# Patient Record
Sex: FEMALE | Race: WHITE | NOT HISPANIC OR LATINO
[De-identification: names, ages, dates, MRNs, and addresses within clinical notes are randomized per-mention and may not be internally consistent; named-entity substitution may affect disease eponyms.]

---

## 2020-02-18 ENCOUNTER — RESULT REVIEW (OUTPATIENT)
Age: 25
End: 2020-02-18

## 2020-02-27 PROBLEM — Z00.00 ENCOUNTER FOR PREVENTIVE HEALTH EXAMINATION: Status: ACTIVE | Noted: 2020-02-27

## 2020-03-16 ENCOUNTER — LABORATORY RESULT (OUTPATIENT)
Age: 25
End: 2020-03-16

## 2020-03-16 ENCOUNTER — ASOB RESULT (OUTPATIENT)
Age: 25
End: 2020-03-16

## 2020-03-16 ENCOUNTER — APPOINTMENT (OUTPATIENT)
Dept: ANTEPARTUM | Facility: CLINIC | Age: 25
End: 2020-03-16
Payer: COMMERCIAL

## 2020-03-16 PROCEDURE — 36416 COLLJ CAPILLARY BLOOD SPEC: CPT

## 2020-03-16 PROCEDURE — 76813 OB US NUCHAL MEAS 1 GEST: CPT

## 2020-03-23 ENCOUNTER — TRANSCRIPTION ENCOUNTER (OUTPATIENT)
Age: 25
End: 2020-03-23

## 2020-05-12 ENCOUNTER — APPOINTMENT (OUTPATIENT)
Dept: ANTEPARTUM | Facility: CLINIC | Age: 25
End: 2020-05-12
Payer: COMMERCIAL

## 2020-05-12 ENCOUNTER — ASOB RESULT (OUTPATIENT)
Age: 25
End: 2020-05-12

## 2020-05-12 PROCEDURE — 76811 OB US DETAILED SNGL FETUS: CPT

## 2020-05-12 PROCEDURE — 99201 OFFICE OUTPATIENT NEW 10 MINUTES: CPT | Mod: 25

## 2020-05-19 ENCOUNTER — APPOINTMENT (OUTPATIENT)
Dept: ANTEPARTUM | Facility: CLINIC | Age: 25
End: 2020-05-19

## 2020-07-07 ENCOUNTER — APPOINTMENT (OUTPATIENT)
Dept: ANTEPARTUM | Facility: CLINIC | Age: 25
End: 2020-07-07
Payer: COMMERCIAL

## 2020-07-07 ENCOUNTER — ASOB RESULT (OUTPATIENT)
Age: 25
End: 2020-07-07

## 2020-07-07 PROCEDURE — 76816 OB US FOLLOW-UP PER FETUS: CPT

## 2020-09-29 ENCOUNTER — INPATIENT (INPATIENT)
Facility: HOSPITAL | Age: 25
LOS: 2 days | Discharge: HOME | End: 2020-10-02
Attending: OBSTETRICS & GYNECOLOGY | Admitting: OBSTETRICS & GYNECOLOGY
Payer: COMMERCIAL

## 2020-09-29 DIAGNOSIS — Z3A.00 WEEKS OF GESTATION OF PREGNANCY NOT SPECIFIED: ICD-10-CM

## 2020-09-29 DIAGNOSIS — O26.899 OTHER SPECIFIED PREGNANCY RELATED CONDITIONS, UNSPECIFIED TRIMESTER: ICD-10-CM

## 2020-09-30 VITALS — HEIGHT: 62 IN | WEIGHT: 165.35 LBS

## 2020-09-30 DIAGNOSIS — Z34.80 ENCOUNTER FOR SUPERVISION OF OTHER NORMAL PREGNANCY, UNSPECIFIED TRIMESTER: ICD-10-CM

## 2020-09-30 LAB
BASOPHILS # BLD AUTO: 0.01 K/UL — SIGNIFICANT CHANGE UP (ref 0–0.2)
BASOPHILS NFR BLD AUTO: 0.1 % — SIGNIFICANT CHANGE UP (ref 0–2)
BLD GP AB SCN SERPL QL: NEGATIVE — SIGNIFICANT CHANGE UP
EOSINOPHIL # BLD AUTO: 0.07 K/UL — SIGNIFICANT CHANGE UP (ref 0–0.5)
EOSINOPHIL NFR BLD AUTO: 0.6 % — SIGNIFICANT CHANGE UP (ref 0–6)
HCT VFR BLD CALC: 39.2 % — SIGNIFICANT CHANGE UP (ref 34.5–45)
HGB BLD-MCNC: 13.2 G/DL — SIGNIFICANT CHANGE UP (ref 11.5–15.5)
IMM GRANULOCYTES NFR BLD AUTO: 0.6 % — SIGNIFICANT CHANGE UP (ref 0–1.5)
LYMPHOCYTES # BLD AUTO: 19.4 % — SIGNIFICANT CHANGE UP (ref 13–44)
LYMPHOCYTES # BLD AUTO: 2.44 K/UL — SIGNIFICANT CHANGE UP (ref 1–3.3)
MCHC RBC-ENTMCNC: 29.6 PG — SIGNIFICANT CHANGE UP (ref 27–34)
MCHC RBC-ENTMCNC: 33.7 GM/DL — SIGNIFICANT CHANGE UP (ref 32–36)
MCV RBC AUTO: 87.9 FL — SIGNIFICANT CHANGE UP (ref 80–100)
MONOCYTES # BLD AUTO: 0.66 K/UL — SIGNIFICANT CHANGE UP (ref 0–0.9)
MONOCYTES NFR BLD AUTO: 5.3 % — SIGNIFICANT CHANGE UP (ref 2–14)
NEUTROPHILS # BLD AUTO: 9.3 K/UL — HIGH (ref 1.8–7.4)
NEUTROPHILS NFR BLD AUTO: 74 % — SIGNIFICANT CHANGE UP (ref 43–77)
NRBC # BLD: 0 /100 WBCS — SIGNIFICANT CHANGE UP (ref 0–0)
PLATELET # BLD AUTO: 335 K/UL — SIGNIFICANT CHANGE UP (ref 150–400)
RBC # BLD: 4.46 M/UL — SIGNIFICANT CHANGE UP (ref 3.8–5.2)
RBC # FLD: 13.7 % — SIGNIFICANT CHANGE UP (ref 10.3–14.5)
RH IG SCN BLD-IMP: POSITIVE — SIGNIFICANT CHANGE UP
RH IG SCN BLD-IMP: POSITIVE — SIGNIFICANT CHANGE UP
SARS-COV-2 RNA SPEC QL NAA+PROBE: SIGNIFICANT CHANGE UP
T PALLIDUM AB TITR SER: NEGATIVE — SIGNIFICANT CHANGE UP
WBC # BLD: 12.56 K/UL — HIGH (ref 3.8–10.5)
WBC # FLD AUTO: 12.56 K/UL — HIGH (ref 3.8–10.5)

## 2020-09-30 RX ORDER — SODIUM CHLORIDE 9 MG/ML
1000 INJECTION, SOLUTION INTRAVENOUS
Refills: 0 | Status: DISCONTINUED | OUTPATIENT
Start: 2020-09-30 | End: 2020-10-01

## 2020-09-30 RX ORDER — OXYTOCIN 10 UNIT/ML
333.33 VIAL (ML) INJECTION
Qty: 20 | Refills: 0 | Status: DISCONTINUED | OUTPATIENT
Start: 2020-09-30 | End: 2020-10-02

## 2020-09-30 RX ORDER — OXYTOCIN 10 UNIT/ML
4 VIAL (ML) INJECTION
Qty: 30 | Refills: 0 | Status: DISCONTINUED | OUTPATIENT
Start: 2020-09-30 | End: 2020-10-02

## 2020-09-30 RX ORDER — CITRIC ACID/SODIUM CITRATE 300-500 MG
15 SOLUTION, ORAL ORAL EVERY 6 HOURS
Refills: 0 | Status: DISCONTINUED | OUTPATIENT
Start: 2020-09-30 | End: 2020-10-01

## 2020-09-30 RX ADMIN — SODIUM CHLORIDE 125 MILLILITER(S): 9 INJECTION, SOLUTION INTRAVENOUS at 09:13

## 2020-09-30 RX ADMIN — Medication 4 MILLIUNIT(S)/MIN: at 17:18

## 2020-10-01 ENCOUNTER — APPOINTMENT (OUTPATIENT)
Dept: ANTEPARTUM | Facility: CLINIC | Age: 25
End: 2020-10-01

## 2020-10-01 ENCOUNTER — TRANSCRIPTION ENCOUNTER (OUTPATIENT)
Age: 25
End: 2020-10-01

## 2020-10-01 LAB
SARS-COV-2 IGG SERPL QL IA: NEGATIVE — SIGNIFICANT CHANGE UP
SARS-COV-2 IGM SERPL IA-ACNC: <0.1 INDEX — SIGNIFICANT CHANGE UP

## 2020-10-01 PROCEDURE — 86850 RBC ANTIBODY SCREEN: CPT

## 2020-10-01 PROCEDURE — 85025 COMPLETE CBC W/AUTO DIFF WBC: CPT

## 2020-10-01 PROCEDURE — 86780 TREPONEMA PALLIDUM: CPT

## 2020-10-01 PROCEDURE — 59050 FETAL MONITOR W/REPORT: CPT

## 2020-10-01 PROCEDURE — 86901 BLOOD TYPING SEROLOGIC RH(D): CPT

## 2020-10-01 PROCEDURE — 59025 FETAL NON-STRESS TEST: CPT

## 2020-10-01 PROCEDURE — G0463: CPT

## 2020-10-01 PROCEDURE — U0003: CPT

## 2020-10-01 PROCEDURE — 86769 SARS-COV-2 COVID-19 ANTIBODY: CPT

## 2020-10-01 PROCEDURE — 86900 BLOOD TYPING SEROLOGIC ABO: CPT

## 2020-10-01 RX ORDER — TETANUS TOXOID, REDUCED DIPHTHERIA TOXOID AND ACELLULAR PERTUSSIS VACCINE, ADSORBED 5; 2.5; 8; 8; 2.5 [IU]/.5ML; [IU]/.5ML; UG/.5ML; UG/.5ML; UG/.5ML
0.5 SUSPENSION INTRAMUSCULAR ONCE
Refills: 0 | Status: DISCONTINUED | OUTPATIENT
Start: 2020-10-01 | End: 2020-10-02

## 2020-10-01 RX ORDER — DIBUCAINE 1 %
1 OINTMENT (GRAM) RECTAL EVERY 6 HOURS
Refills: 0 | Status: DISCONTINUED | OUTPATIENT
Start: 2020-10-01 | End: 2020-10-02

## 2020-10-01 RX ORDER — HYDROCORTISONE 1 %
1 OINTMENT (GRAM) TOPICAL EVERY 6 HOURS
Refills: 0 | Status: DISCONTINUED | OUTPATIENT
Start: 2020-10-01 | End: 2020-10-02

## 2020-10-01 RX ORDER — IBUPROFEN 200 MG
600 TABLET ORAL EVERY 6 HOURS
Refills: 0 | Status: COMPLETED | OUTPATIENT
Start: 2020-10-01 | End: 2021-08-30

## 2020-10-01 RX ORDER — IBUPROFEN 200 MG
1 TABLET ORAL
Qty: 0 | Refills: 0 | DISCHARGE
Start: 2020-10-01

## 2020-10-01 RX ORDER — LANOLIN
1 OINTMENT (GRAM) TOPICAL EVERY 6 HOURS
Refills: 0 | Status: DISCONTINUED | OUTPATIENT
Start: 2020-10-01 | End: 2020-10-02

## 2020-10-01 RX ORDER — IBUPROFEN 200 MG
600 TABLET ORAL EVERY 6 HOURS
Refills: 0 | Status: DISCONTINUED | OUTPATIENT
Start: 2020-10-01 | End: 2020-10-02

## 2020-10-01 RX ORDER — AER TRAVELER 0.5 G/1
1 SOLUTION RECTAL; TOPICAL EVERY 4 HOURS
Refills: 0 | Status: DISCONTINUED | OUTPATIENT
Start: 2020-10-01 | End: 2020-10-02

## 2020-10-01 RX ORDER — OXYTOCIN 10 UNIT/ML
333.33 VIAL (ML) INJECTION
Qty: 20 | Refills: 0 | Status: DISCONTINUED | OUTPATIENT
Start: 2020-10-01 | End: 2020-10-02

## 2020-10-01 RX ORDER — KETOROLAC TROMETHAMINE 30 MG/ML
30 SYRINGE (ML) INJECTION ONCE
Refills: 0 | Status: DISCONTINUED | OUTPATIENT
Start: 2020-10-01 | End: 2020-10-01

## 2020-10-01 RX ORDER — ACETAMINOPHEN 500 MG
975 TABLET ORAL
Refills: 0 | Status: DISCONTINUED | OUTPATIENT
Start: 2020-10-01 | End: 2020-10-02

## 2020-10-01 RX ORDER — SIMETHICONE 80 MG/1
80 TABLET, CHEWABLE ORAL EVERY 4 HOURS
Refills: 0 | Status: DISCONTINUED | OUTPATIENT
Start: 2020-10-01 | End: 2020-10-02

## 2020-10-01 RX ORDER — PRAMOXINE HYDROCHLORIDE 150 MG/15G
1 AEROSOL, FOAM RECTAL EVERY 4 HOURS
Refills: 0 | Status: DISCONTINUED | OUTPATIENT
Start: 2020-10-01 | End: 2020-10-02

## 2020-10-01 RX ORDER — ACETAMINOPHEN 500 MG
3 TABLET ORAL
Qty: 0 | Refills: 0 | DISCHARGE
Start: 2020-10-01

## 2020-10-01 RX ORDER — OXYCODONE HYDROCHLORIDE 5 MG/1
5 TABLET ORAL ONCE
Refills: 0 | Status: DISCONTINUED | OUTPATIENT
Start: 2020-10-01 | End: 2020-10-02

## 2020-10-01 RX ORDER — ASPIRIN/CALCIUM CARB/MAGNESIUM 324 MG
0 TABLET ORAL
Qty: 0 | Refills: 0 | DISCHARGE

## 2020-10-01 RX ORDER — MAGNESIUM HYDROXIDE 400 MG/1
30 TABLET, CHEWABLE ORAL
Refills: 0 | Status: DISCONTINUED | OUTPATIENT
Start: 2020-10-01 | End: 2020-10-02

## 2020-10-01 RX ORDER — BENZOCAINE 10 %
1 GEL (GRAM) MUCOUS MEMBRANE EVERY 6 HOURS
Refills: 0 | Status: DISCONTINUED | OUTPATIENT
Start: 2020-10-01 | End: 2020-10-02

## 2020-10-01 RX ORDER — SODIUM CHLORIDE 9 MG/ML
3 INJECTION INTRAMUSCULAR; INTRAVENOUS; SUBCUTANEOUS EVERY 8 HOURS
Refills: 0 | Status: DISCONTINUED | OUTPATIENT
Start: 2020-10-01 | End: 2020-10-02

## 2020-10-01 RX ORDER — OXYCODONE HYDROCHLORIDE 5 MG/1
5 TABLET ORAL
Refills: 0 | Status: DISCONTINUED | OUTPATIENT
Start: 2020-10-01 | End: 2020-10-02

## 2020-10-01 RX ORDER — DIPHENHYDRAMINE HCL 50 MG
25 CAPSULE ORAL EVERY 6 HOURS
Refills: 0 | Status: DISCONTINUED | OUTPATIENT
Start: 2020-10-01 | End: 2020-10-02

## 2020-10-01 RX ADMIN — Medication 30 MILLIGRAM(S): at 10:00

## 2020-10-01 RX ADMIN — Medication 975 MILLIGRAM(S): at 20:14

## 2020-10-01 RX ADMIN — Medication 975 MILLIGRAM(S): at 20:45

## 2020-10-01 NOTE — DISCHARGE NOTE OB - PATIENT PORTAL LINK FT
You can access the FollowMyHealth Patient Portal offered by [facility] by registering at the following website: [email or web address]. By joining [employer]’s FollowMyHealth portal, you will also be able to view your health information using other applications (apps) compatible with our system.

## 2020-10-01 NOTE — DISCHARGE NOTE OB - MATERIALS PROVIDED
[facility] Hearing Screen Program/[facility] [place] Screening Program/Birth Certificate Instructions/Discharge Medication Information for Patients and Families Pocket Guide/Breastfeeding Mother’s Support Group Information/Guide to Postpartum Care/Shaken Baby Prevention Handout/Breastfeeding Guide and Packet/Breastfeeding Log/Back To Sleep Handout

## 2020-10-01 NOTE — DISCHARGE NOTE OB - CARE PROVIDER_API CALL
Hellen Sánchez  OBSTETRICS AND GYNECOLOGY  [address], [address]  [place] [address]  Phone: [phone]  Fax: [phone]  Follow Up Time:

## 2020-10-01 NOTE — DISCHARGE NOTE OB - CARE PLAN
Principal Discharge DX:	 (normal spontaneous vaginal delivery)  Goal:	recovery  Assessment and plan of treatment:	return to baseline health, regular diet, pain control, follow up with Dr Sánchez

## 2020-10-01 NOTE — DISCHARGE NOTE OB - MEDICATION SUMMARY - MEDICATIONS TO TAKE
I will START or STAY ON the medications listed below when I get home from the hospital:    ibuprofen 600 mg oral tablet  -- 1 tab(s) by mouth every 6 hours  -- Indication: For cramping    acetaminophen 325 mg oral tablet  -- 3 tab(s) by mouth   -- Indication: For mild pain

## 2020-10-02 VITALS
TEMPERATURE: 97 F | OXYGEN SATURATION: 97 % | DIASTOLIC BLOOD PRESSURE: 68 MMHG | RESPIRATION RATE: 16 BRPM | HEART RATE: 73 BPM | SYSTOLIC BLOOD PRESSURE: 101 MMHG

## 2020-10-02 NOTE — PROGRESS NOTE ADULT - SUBJECTIVE AND OBJECTIVE BOX
Postpartum Note- PPD#1    Allergies    No Known Drug Allergies  Tree Nuts (Anaphylaxis)    Intolerances        Prenatal labs:  Rubella IgG: Immune    adore  RPR:  Negative       Blood Type: B+    S:Patient is a  25y   G1    P 1     PPD#1         S/P     VAVD  Patient w/o complaints, pain is controlled.    Pt is OOB, tolerating PO, passing flatus. Lochia WNL.   Feeding: Breastfeeding    O:  Vital Signs Last 24 Hrs  T(C): 36.3 (02 Oct 2020 06:18), Max: 37.2 (01 Oct 2020 10:00)  T(F): 97.3 (02 Oct 2020 06:18), Max: 99 (01 Oct 2020 10:00)  HR: 73 (02 Oct 2020 06:18) (61 - 92)  BP: 101/68 (02 Oct 2020 06:18) (98/71 - 115/78)  BP(mean): --  RR: 16 (02 Oct 2020 06:18) (16 - 20)  SpO2: 97% (02 Oct 2020 06:18) (97% - 99%)     Gen: NAD  CV: rrr s1s2, CTABL  Abdomen: Soft, nontender, non-distended, fundus firm.  Lochia: WNL  Perineum: MLE  Ext: Neg edema, Neg calf tenderness.  Pedal pulses palpated B/L    LABS:    Hemoglobin: 13.2 g/dL (09-30 @ 09:14)      Hematocrit: 39.2 % (09-30 @ 09:14)      A/P:  25y  PPD # 1      S/P    VAVD,      doing well    PMHx: none  Current Issues: none    Increase OOB  Regular diet  PO Pain protocol  AM H&H  Routine Postpartum Care

## 2021-08-26 ENCOUNTER — RESULT REVIEW (OUTPATIENT)
Age: 26
End: 2021-08-26

## 2022-09-01 ENCOUNTER — RESULT REVIEW (OUTPATIENT)
Age: 27
End: 2022-09-01

## 2023-02-27 ENCOUNTER — ASOB RESULT (OUTPATIENT)
Age: 28
End: 2023-02-27

## 2023-02-27 ENCOUNTER — APPOINTMENT (OUTPATIENT)
Dept: ANTEPARTUM | Facility: CLINIC | Age: 28
End: 2023-02-27
Payer: COMMERCIAL

## 2023-02-27 PROCEDURE — 76801 OB US < 14 WKS SINGLE FETUS: CPT

## 2023-02-27 PROCEDURE — 76813 OB US NUCHAL MEAS 1 GEST: CPT | Mod: 59

## 2023-04-26 ENCOUNTER — ASOB RESULT (OUTPATIENT)
Age: 28
End: 2023-04-26

## 2023-04-26 ENCOUNTER — APPOINTMENT (OUTPATIENT)
Dept: ANTEPARTUM | Facility: CLINIC | Age: 28
End: 2023-04-26
Payer: COMMERCIAL

## 2023-04-26 PROCEDURE — 76811 OB US DETAILED SNGL FETUS: CPT

## 2023-05-05 ENCOUNTER — APPOINTMENT (OUTPATIENT)
Dept: ANTEPARTUM | Facility: CLINIC | Age: 28
End: 2023-05-05
Payer: COMMERCIAL

## 2023-05-05 ENCOUNTER — ASOB RESULT (OUTPATIENT)
Age: 28
End: 2023-05-05

## 2023-05-05 PROCEDURE — 76816 OB US FOLLOW-UP PER FETUS: CPT

## 2023-06-20 ENCOUNTER — ASOB RESULT (OUTPATIENT)
Age: 28
End: 2023-06-20

## 2023-06-20 ENCOUNTER — APPOINTMENT (OUTPATIENT)
Dept: ANTEPARTUM | Facility: CLINIC | Age: 28
End: 2023-06-20
Payer: COMMERCIAL

## 2023-06-20 PROCEDURE — 76816 OB US FOLLOW-UP PER FETUS: CPT

## 2023-07-18 ENCOUNTER — ASOB RESULT (OUTPATIENT)
Age: 28
End: 2023-07-18

## 2023-07-18 ENCOUNTER — APPOINTMENT (OUTPATIENT)
Dept: ANTEPARTUM | Facility: CLINIC | Age: 28
End: 2023-07-18
Payer: COMMERCIAL

## 2023-07-18 PROCEDURE — 76816 OB US FOLLOW-UP PER FETUS: CPT

## 2023-08-14 ENCOUNTER — ASOB RESULT (OUTPATIENT)
Age: 28
End: 2023-08-14

## 2023-08-14 ENCOUNTER — APPOINTMENT (OUTPATIENT)
Dept: ANTEPARTUM | Facility: CLINIC | Age: 28
End: 2023-08-14
Payer: COMMERCIAL

## 2023-08-14 PROCEDURE — 76819 FETAL BIOPHYS PROFIL W/O NST: CPT | Mod: 59

## 2023-08-14 PROCEDURE — 76816 OB US FOLLOW-UP PER FETUS: CPT

## 2023-08-22 ENCOUNTER — ASOB RESULT (OUTPATIENT)
Age: 28
End: 2023-08-22

## 2023-08-22 ENCOUNTER — APPOINTMENT (OUTPATIENT)
Dept: ANTEPARTUM | Facility: CLINIC | Age: 28
End: 2023-08-22
Payer: COMMERCIAL

## 2023-08-22 PROCEDURE — 76819 FETAL BIOPHYS PROFIL W/O NST: CPT

## 2023-08-30 ENCOUNTER — ASOB RESULT (OUTPATIENT)
Age: 28
End: 2023-08-30

## 2023-08-30 ENCOUNTER — APPOINTMENT (OUTPATIENT)
Dept: ANTEPARTUM | Facility: CLINIC | Age: 28
End: 2023-08-30
Payer: COMMERCIAL

## 2023-08-30 PROCEDURE — 76819 FETAL BIOPHYS PROFIL W/O NST: CPT

## 2023-09-05 ENCOUNTER — INPATIENT (INPATIENT)
Facility: HOSPITAL | Age: 28
LOS: 1 days | Discharge: HOME | End: 2023-09-07
Attending: OBSTETRICS & GYNECOLOGY | Admitting: OBSTETRICS & GYNECOLOGY
Payer: COMMERCIAL

## 2023-09-05 VITALS — OXYGEN SATURATION: 98 %

## 2023-09-05 DIAGNOSIS — O26.899 OTHER SPECIFIED PREGNANCY RELATED CONDITIONS, UNSPECIFIED TRIMESTER: ICD-10-CM

## 2023-09-05 DIAGNOSIS — Z34.80 ENCOUNTER FOR SUPERVISION OF OTHER NORMAL PREGNANCY, UNSPECIFIED TRIMESTER: ICD-10-CM

## 2023-09-05 LAB
BASOPHILS # BLD AUTO: 0.02 K/UL — SIGNIFICANT CHANGE UP (ref 0–0.2)
BASOPHILS NFR BLD AUTO: 0.1 % — SIGNIFICANT CHANGE UP (ref 0–2)
BLD GP AB SCN SERPL QL: NEGATIVE — SIGNIFICANT CHANGE UP
EOSINOPHIL # BLD AUTO: 0.08 K/UL — SIGNIFICANT CHANGE UP (ref 0–0.5)
EOSINOPHIL NFR BLD AUTO: 0.6 % — SIGNIFICANT CHANGE UP (ref 0–6)
HCT VFR BLD CALC: 32.4 % — LOW (ref 34.5–45)
HGB BLD-MCNC: 10.7 G/DL — LOW (ref 11.5–15.5)
IMM GRANULOCYTES NFR BLD AUTO: 0.6 % — SIGNIFICANT CHANGE UP (ref 0–0.9)
LYMPHOCYTES # BLD AUTO: 22.7 % — SIGNIFICANT CHANGE UP (ref 13–44)
LYMPHOCYTES # BLD AUTO: 3.24 K/UL — SIGNIFICANT CHANGE UP (ref 1–3.3)
MCHC RBC-ENTMCNC: 27.8 PG — SIGNIFICANT CHANGE UP (ref 27–34)
MCHC RBC-ENTMCNC: 33 GM/DL — SIGNIFICANT CHANGE UP (ref 32–36)
MCV RBC AUTO: 84.2 FL — SIGNIFICANT CHANGE UP (ref 80–100)
MONOCYTES # BLD AUTO: 0.98 K/UL — HIGH (ref 0–0.9)
MONOCYTES NFR BLD AUTO: 6.9 % — SIGNIFICANT CHANGE UP (ref 2–14)
NEUTROPHILS # BLD AUTO: 9.89 K/UL — HIGH (ref 1.8–7.4)
NEUTROPHILS NFR BLD AUTO: 69.1 % — SIGNIFICANT CHANGE UP (ref 43–77)
NRBC # BLD: 0 /100 WBCS — SIGNIFICANT CHANGE UP (ref 0–0)
PLATELET # BLD AUTO: 343 K/UL — SIGNIFICANT CHANGE UP (ref 150–400)
RBC # BLD: 3.85 M/UL — SIGNIFICANT CHANGE UP (ref 3.8–5.2)
RBC # FLD: 14.6 % — HIGH (ref 10.3–14.5)
RH IG SCN BLD-IMP: POSITIVE — SIGNIFICANT CHANGE UP
T PALLIDUM AB TITR SER: NEGATIVE — SIGNIFICANT CHANGE UP
WBC # BLD: 14.3 K/UL — HIGH (ref 3.8–10.5)
WBC # FLD AUTO: 14.3 K/UL — HIGH (ref 3.8–10.5)

## 2023-09-05 RX ORDER — IBUPROFEN 200 MG
600 TABLET ORAL EVERY 6 HOURS
Refills: 0 | Status: DISCONTINUED | OUTPATIENT
Start: 2023-09-05 | End: 2023-09-07

## 2023-09-05 RX ORDER — IBUPROFEN 200 MG
600 TABLET ORAL EVERY 6 HOURS
Refills: 0 | Status: COMPLETED | OUTPATIENT
Start: 2023-09-05 | End: 2024-08-03

## 2023-09-05 RX ORDER — SODIUM CHLORIDE 9 MG/ML
3 INJECTION INTRAMUSCULAR; INTRAVENOUS; SUBCUTANEOUS EVERY 8 HOURS
Refills: 0 | Status: DISCONTINUED | OUTPATIENT
Start: 2023-09-05 | End: 2023-09-07

## 2023-09-05 RX ORDER — HYDROCORTISONE 1 %
1 OINTMENT (GRAM) TOPICAL EVERY 6 HOURS
Refills: 0 | Status: DISCONTINUED | OUTPATIENT
Start: 2023-09-05 | End: 2023-09-07

## 2023-09-05 RX ORDER — MAGNESIUM HYDROXIDE 400 MG/1
30 TABLET, CHEWABLE ORAL
Refills: 0 | Status: DISCONTINUED | OUTPATIENT
Start: 2023-09-05 | End: 2023-09-07

## 2023-09-05 RX ORDER — LANOLIN
1 OINTMENT (GRAM) TOPICAL EVERY 6 HOURS
Refills: 0 | Status: DISCONTINUED | OUTPATIENT
Start: 2023-09-05 | End: 2023-09-07

## 2023-09-05 RX ORDER — DIBUCAINE 1 %
1 OINTMENT (GRAM) RECTAL EVERY 6 HOURS
Refills: 0 | Status: DISCONTINUED | OUTPATIENT
Start: 2023-09-05 | End: 2023-09-07

## 2023-09-05 RX ORDER — CHLORHEXIDINE GLUCONATE 213 G/1000ML
1 SOLUTION TOPICAL DAILY
Refills: 0 | Status: DISCONTINUED | OUTPATIENT
Start: 2023-09-05 | End: 2023-09-05

## 2023-09-05 RX ORDER — SODIUM CHLORIDE 9 MG/ML
1000 INJECTION, SOLUTION INTRAVENOUS
Refills: 0 | Status: DISCONTINUED | OUTPATIENT
Start: 2023-09-05 | End: 2023-09-05

## 2023-09-05 RX ORDER — KETOROLAC TROMETHAMINE 30 MG/ML
30 SYRINGE (ML) INJECTION ONCE
Refills: 0 | Status: DISCONTINUED | OUTPATIENT
Start: 2023-09-05 | End: 2023-09-05

## 2023-09-05 RX ORDER — BENZOCAINE 10 %
1 GEL (GRAM) MUCOUS MEMBRANE EVERY 6 HOURS
Refills: 0 | Status: DISCONTINUED | OUTPATIENT
Start: 2023-09-05 | End: 2023-09-07

## 2023-09-05 RX ORDER — TETANUS TOXOID, REDUCED DIPHTHERIA TOXOID AND ACELLULAR PERTUSSIS VACCINE, ADSORBED 5; 2.5; 8; 8; 2.5 [IU]/.5ML; [IU]/.5ML; UG/.5ML; UG/.5ML; UG/.5ML
0.5 SUSPENSION INTRAMUSCULAR ONCE
Refills: 0 | Status: DISCONTINUED | OUTPATIENT
Start: 2023-09-05 | End: 2023-09-07

## 2023-09-05 RX ORDER — OXYCODONE HYDROCHLORIDE 5 MG/1
5 TABLET ORAL ONCE
Refills: 0 | Status: DISCONTINUED | OUTPATIENT
Start: 2023-09-05 | End: 2023-09-07

## 2023-09-05 RX ORDER — DIPHENHYDRAMINE HCL 50 MG
25 CAPSULE ORAL EVERY 6 HOURS
Refills: 0 | Status: DISCONTINUED | OUTPATIENT
Start: 2023-09-05 | End: 2023-09-07

## 2023-09-05 RX ORDER — OXYTOCIN 10 UNIT/ML
41.67 VIAL (ML) INJECTION
Qty: 20 | Refills: 0 | Status: DISCONTINUED | OUTPATIENT
Start: 2023-09-05 | End: 2023-09-07

## 2023-09-05 RX ORDER — OXYTOCIN 10 UNIT/ML
4 VIAL (ML) INJECTION
Qty: 30 | Refills: 0 | Status: DISCONTINUED | OUTPATIENT
Start: 2023-09-05 | End: 2023-09-07

## 2023-09-05 RX ORDER — ACETAMINOPHEN 500 MG
975 TABLET ORAL
Refills: 0 | Status: DISCONTINUED | OUTPATIENT
Start: 2023-09-05 | End: 2023-09-07

## 2023-09-05 RX ORDER — SIMETHICONE 80 MG/1
80 TABLET, CHEWABLE ORAL EVERY 4 HOURS
Refills: 0 | Status: DISCONTINUED | OUTPATIENT
Start: 2023-09-05 | End: 2023-09-07

## 2023-09-05 RX ORDER — AER TRAVELER 0.5 G/1
1 SOLUTION RECTAL; TOPICAL EVERY 4 HOURS
Refills: 0 | Status: DISCONTINUED | OUTPATIENT
Start: 2023-09-05 | End: 2023-09-07

## 2023-09-05 RX ORDER — SODIUM CHLORIDE 9 MG/ML
1000 INJECTION, SOLUTION INTRAVENOUS ONCE
Refills: 0 | Status: COMPLETED | OUTPATIENT
Start: 2023-09-05 | End: 2023-09-05

## 2023-09-05 RX ORDER — PRAMOXINE HYDROCHLORIDE 150 MG/15G
1 AEROSOL, FOAM RECTAL EVERY 4 HOURS
Refills: 0 | Status: DISCONTINUED | OUTPATIENT
Start: 2023-09-05 | End: 2023-09-07

## 2023-09-05 RX ORDER — OXYCODONE HYDROCHLORIDE 5 MG/1
5 TABLET ORAL
Refills: 0 | Status: DISCONTINUED | OUTPATIENT
Start: 2023-09-05 | End: 2023-09-07

## 2023-09-05 RX ORDER — CITRIC ACID/SODIUM CITRATE 300-500 MG
15 SOLUTION, ORAL ORAL EVERY 6 HOURS
Refills: 0 | Status: DISCONTINUED | OUTPATIENT
Start: 2023-09-05 | End: 2023-09-05

## 2023-09-05 RX ORDER — OXYTOCIN 10 UNIT/ML
333.33 VIAL (ML) INJECTION
Qty: 20 | Refills: 0 | Status: DISCONTINUED | OUTPATIENT
Start: 2023-09-05 | End: 2023-09-05

## 2023-09-05 RX ORDER — OXYTOCIN 10 UNIT/ML
10 VIAL (ML) INJECTION ONCE
Refills: 0 | Status: COMPLETED | OUTPATIENT
Start: 2023-09-05 | End: 2023-09-05

## 2023-09-05 RX ADMIN — Medication 600 MILLIGRAM(S): at 20:34

## 2023-09-05 RX ADMIN — Medication 10 UNIT(S): at 14:36

## 2023-09-05 RX ADMIN — Medication 1 TABLET(S): at 20:34

## 2023-09-05 RX ADMIN — SODIUM CHLORIDE 1000 MILLILITER(S): 9 INJECTION, SOLUTION INTRAVENOUS at 13:28

## 2023-09-05 RX ADMIN — Medication 600 MILLIGRAM(S): at 21:15

## 2023-09-05 RX ADMIN — Medication 30 MILLIGRAM(S): at 16:38

## 2023-09-05 RX ADMIN — Medication 125 MILLIUNIT(S)/MIN: at 16:38

## 2023-09-05 RX ADMIN — Medication 125 MILLIUNIT(S)/MIN: at 14:36

## 2023-09-05 NOTE — OB RN DELIVERY SUMMARY - NSSELHIDDEN_OBGYN_ALL_OB_FT
[NS_DeliveryAttending1_OBGYN_ALL_OB_FT:[email or web address]=],[NS_DeliveryRN_OBGYN_ALL_OB_FT:[email or web address]=]

## 2023-09-05 NOTE — OB NEONATOLOGY/PEDIATRICIAN DELIVERY SUMMARY - NSPEDSNEONOTESA_OBGYN_ALL_OB_FT
Requested by Dr. Sánchez to attend delivery of a 39 5/7 weeker born via  to a 28 yo  mother who is B+ blood type, PNL (-), GBS neg 23.  Maternal hx of VAVD in . Prenatal history remarkable for Fetal Alert 23 - Right hydronephrosis 12.4-13.6mm.  SROM with clear fluid at 0100 (13.6 hours PTD). Infant emerged in cephalic position, vigorous with spontaneous cry.  Delayed cord clamping x60 seconds.  Infant brought to warmer and received routine  resuscitation with good response. Strong cry, pink, active. PE unremarkable. EOS 0.11 .  Mom plans to breast and formula feed, defers Hepatitis B vaccine, and declines circumcision.  Infant transitioned to non-separation and routine care. Apgars 9/9. Requested by Dr. Sánchez to attend delivery of a 39 5/7 weeker born via  to a 26 yo  mother who is B+ blood type, PNL (-), GBS neg 23.  Maternal hx of VAVD in . Prenatal history remarkable for Fetal Alert 23 - Right hydronephrosis 12.4-13.6mm.  SROM with clear fluid at 0100 (13.6 hours PTD). Infant emerged in cephalic position, vigorous with spontaneous cry.  Delayed cord clamping x30 seconds.  Infant brought to warmer and received routine  resuscitation with good response. Strong cry, pink, active. PE unremarkable. EOS 0.11 .  Mom plans to breast and formula feed, defers Hepatitis B vaccine, and declines circumcision.  Infant transitioned to non-separation and routine care. Apgars 9/9.

## 2023-09-05 NOTE — OB PROVIDER H&P - ASSESSMENT
A&P: 28 year old  @ 39+5 admitted with PROM at 1a on . No other complaints  Labor: admit to L&D  -Buccal cytotec  -routine labs  -EFM/toco  -NPO, IV hydration  Fetal: cat 1 tracing, fetal status reassuring  GBS: neg  Analgesia: Epi      d/w Dr Gonzalo Cedeño PGY3

## 2023-09-05 NOTE — OB PROVIDER LABOR PROGRESS NOTE - NS_SUBJECTIVE/OBJECTIVE_OBGYN_ALL_OB_FT
PA Note:  Patient seen and evaluated at bedside. Patient comfortable now with epidural in place.
PA Note:  Patient seen and evaluated at bedside. Patient c/o increased pain.
PA Note:  Patient seen and evaluated at bedside. Patient c/o increased pain.

## 2023-09-05 NOTE — PRE-ANESTHESIA EVALUATION ADULT - NSANTHPMHFT_GEN_ALL_CORE
28F denies pmh. No significant hx of cv/pulm dz, Denies exertional CP/SOB. No signs of active cad/chf. Denies hx of asthma, HTN, bleeding d/o, back issues.

## 2023-09-05 NOTE — OB PROVIDER H&P - HISTORY OF PRESENT ILLNESS
R3 H&P    Pt is a 27y/o  at 39+5 admitted for PROM. Patient endorses a small gush of fluid at 1a and states that she has been leaking since this time. Denies Cx, VB. Endorses FM  Prenatal course uncomplicated   GBS neg  EFW 3200    OBHx: 2020 VAVD 6#14  GynHx: Denies cysts, fibroids, abnormal pap, STIs  PMHx: Denies  PSHx: Denies  Med: PNV  All: NKDA  SH: Denies toxic habits x3

## 2023-09-05 NOTE — OB PROVIDER H&P - NSLOWPPHRISK_OBGYN_A_OB
No previous uterine incision/De Pregnancy/Less than or equal to 4 previous vaginal births/No known bleeding disorder/No history of postpartum hemorrhage

## 2023-09-05 NOTE — OB RN TRIAGE NOTE - NSMATERNALFETALCONCERNS_OBGYN_ALL_OB_FT
Fetal Alert  23 - Right hydronephrosis 12.4-13.6mm.  Notify pediatrician for  assessment. -Neha Arciniega, RNC

## 2023-09-05 NOTE — OB PROVIDER DELIVERY SUMMARY - NSPROVIDERDELIVERYNOTE_OBGYN_ALL_OB_FT
, pt delivered of a viable male infant apgar 9/9 weight 7-8, placenta complete and uterus explored. second degree laceration. ebvl 100 cc.

## 2023-09-05 NOTE — OB RN TRIAGE NOTE - BP NONINVASIVE SYSTOLIC (MM HG)
Health Maintenance Summary     Topic Due On Due Status Completed On    Colorectal Cancer Screening - Colonoscopy Dec 27, 2027 Not Due Dec 27, 2017    MAMMOGRAM - BREAST CANCER SCREENING Aug 28, 2018 Not Due Aug 28, 2017    Pap Smear - Cervical Cancer Screening  Oct 28, 2021 Not Due Oct 28, 2016    Immunization - TDAP Pregnancy  Hidden     IMMUNIZATION - DTaP/Tdap/Td Jun 30, 2024 Not Due Jun 30, 2014    Immunization-Influenza Sep 1, 2018 Not Due Oct 19, 2017          Patient is up to date, no discussion needed .           124

## 2023-09-05 NOTE — OB PROVIDER H&P - NSHPPHYSICALEXAM_GEN_ALL_CORE
VS:  Vital Signs Last 24 Hrs  T(C): 36.7 (05 Sep 2023 02:45), Max: 36.7 (05 Sep 2023 02:28)  T(F): 98.1 (05 Sep 2023 02:45), Max: 98.1 (05 Sep 2023 02:28)  HR: 101 (05 Sep 2023 02:51) (91 - 111)  BP: 124/73 (05 Sep 2023 02:45) (124/73 - 124/73)  BP(mean): --  RR: 18 (05 Sep 2023 02:45) (18 - 18)  SpO2: 98% (05 Sep 2023 02:51) (93% - 98%)    Parameters below as of 05 Sep 2023 02:28  Patient On (Oxygen Delivery Method): room air      GA: NAD  Cards: RRR  Pulm: CTAB  EFH: reactive and reassuring  [place]: acontractile  VE: 1.5/5/-3  TAUS: vertex  Speculum: +Pooling, +nitrazine, +ferning

## 2023-09-05 NOTE — OB NEONATOLOGY/PEDIATRICIAN DELIVERY SUMMARY - NSMATERNALFETALCONCERNS_OBGYN_ALL_OB_FT
Ventricular Rate : 76  Atrial Rate : 76  P-R Interval : 162  QRS Duration : 74  Q-T Interval : 400  QTC Calculation(Bazett) : 450  P Axis : 42  R Axis : 22  T Axis : 16  Diagnosis : Normal sinus rhythm  Low voltage QRS  Borderline ECG    Confirmed by Hal CARROLL, [facility] ([id number]) on 1/21/2020 9:30:27 PM   Fetal Alert  23 - Right hydronephrosis 12.4-13.6mm.  Notify pediatrician for  assessment. -Neha Arciniega, RNC

## 2023-09-05 NOTE — OB RN DELIVERY SUMMARY - NS_SEPSISRSKCALC_OBGYN_ALL_OB_FT
EOS calculated successfully. EOS Risk Factor: 0.5/1000 live births ([facility] national incidence); GA=39w5d; Temp=98.24; ROM=13.55; GBS='Negative'; Antibiotics='No antibiotics or any antibiotics < 2 hrs prior to birth'

## 2023-09-05 NOTE — OB PROVIDER LABOR PROGRESS NOTE - NS_OBIHIFHRDETAILS_OBGYN_ALL_OB_FT
130/moderate variability/+Accels/no decels

## 2023-09-05 NOTE — OB PROVIDER LABOR PROGRESS NOTE - NS_EFFACEMANT_OBGYN_ALL_OB_NU
No new care gaps identified.  Powered by [employer] by [employer]. Reference number: [id number].   3/11/2022 3:05:49 PM CST  
70
80
60

## 2023-09-05 NOTE — OB PROVIDER LABOR PROGRESS NOTE - ASSESSMENT
A/P:  -EFM/[place]  -Anesthesia notified for epidural placement  -For repeat VE @10am  -Anticipate   D/w Dr. Gonzalo Thomas PA-C
A/P:  -EFM/[place]  -Patient repositioned and epidural button pressed-if still uncomfortable, will give epidural top-off  -Anticipate   D/w Dr. Gonzalo Thomas PA-C
A/P:  -EFM/[place]  -For one more dose of buccal cytotec when ctx's space out and then will switch to pitocin  -Anticipate   D/w Dr. Gonzalo LAUGHLINC

## 2023-09-05 NOTE — OB RN TRIAGE NOTE - NS_DISPOSITION_OBGYN_ALL_OB
Continue to Observe Area L Indication Text: Tumors in this location are included in Area L (trunk and extremities).  Mohs surgery is indicated for larger tumors, or tumors with aggressive histologic features, in these anatomic locations.

## 2023-09-06 PROCEDURE — 59050 FETAL MONITOR W/REPORT: CPT

## 2023-09-06 PROCEDURE — 86901 BLOOD TYPING SEROLOGIC RH(D): CPT

## 2023-09-06 PROCEDURE — 36415 COLL VENOUS BLD VENIPUNCTURE: CPT

## 2023-09-06 PROCEDURE — 86850 RBC ANTIBODY SCREEN: CPT

## 2023-09-06 PROCEDURE — 86780 TREPONEMA PALLIDUM: CPT

## 2023-09-06 PROCEDURE — 85025 COMPLETE CBC W/AUTO DIFF WBC: CPT

## 2023-09-06 PROCEDURE — 86900 BLOOD TYPING SEROLOGIC ABO: CPT

## 2023-09-06 RX ADMIN — Medication 975 MILLIGRAM(S): at 00:26

## 2023-09-06 RX ADMIN — Medication 1 TABLET(S): at 15:15

## 2023-09-06 RX ADMIN — Medication 975 MILLIGRAM(S): at 01:00

## 2023-09-06 NOTE — PROGRESS NOTE ADULT - SUBJECTIVE AND OBJECTIVE BOX
Postpartum Note- PPD#1    Allergies: No Known Drug Allergies    Blood type: B positive  Rubella: Immune  RPR: Negative     S: Patient is a 28y  PPD#1 s/p .    Patient w/o complaints, pain is controlled.    Pt is OOB, tolerating PO, voiding and passing flatus. Lochia WNL.     O:  Vital Signs Last 24 Hrs  T(C): 36.9 (06 Sep 2023 06:22), Max: 36.9 (06 Sep 2023 00:26)  T(F): 98.5 (06 Sep 2023 06:22), Max: 98.5 (06 Sep 2023 06:22)  HR: 72 (06 Sep 2023 06:22) (63 - 104)  BP: 117/79 (06 Sep 2023 06:22) (97/65 - 142/80)  BP(mean): --  RR: 18 (06 Sep 2023 06:22) (18 - 18)  SpO2: 99% (06 Sep 2023 06:22) (88% - 100%)    Parameters below as of 06 Sep 2023 06:22  Patient On (Oxygen Delivery Method): room air       Gen: NAD  Abdomen: Soft, nontender, non-distended, fundus firm.  Lochia WNL  Ext: Neg edema, Neg calf tenderness    LABS:  Hemoglobin: 10.7 g/dL (23 @ 03:23)  Hematocrit: 32.4 % (23 @ 03:23)      A/P:  28y PPD#1 s/p , doing well.      PMHx: Denies  Current Issues: None    Increase OOB  Regular diet  PO Pain protocol  Continue routine post partum care    Damari Thomas PA-C

## 2023-09-07 ENCOUNTER — APPOINTMENT (OUTPATIENT)
Dept: ANTEPARTUM | Facility: CLINIC | Age: 28
End: 2023-09-07

## 2023-09-07 ENCOUNTER — TRANSCRIPTION ENCOUNTER (OUTPATIENT)
Age: 28
End: 2023-09-07

## 2023-09-07 VITALS
OXYGEN SATURATION: 99 % | RESPIRATION RATE: 18 BRPM | TEMPERATURE: 98 F | SYSTOLIC BLOOD PRESSURE: 112 MMHG | HEART RATE: 75 BPM | DIASTOLIC BLOOD PRESSURE: 69 MMHG

## 2023-09-07 NOTE — DISCHARGE NOTE OB - PATIENT PORTAL LINK FT
You can access the FollowMyHealth Patient Portal offered by [facility] by registering at the following website: [email or web address]. By joining [employer]’s FollowMyHealth portal, you will also be able to view your health information using other applications (apps) compatible with our system.

## 2023-09-07 NOTE — DISCHARGE NOTE OB - CARE PLAN
Principal Discharge DX:	 (normal spontaneous vaginal delivery)  Assessment and plan of treatment:	Return to baseline health, regular diet, pain control. Follow up with Dr. Sánchez.   1

## 2023-09-07 NOTE — DISCHARGE NOTE OB - CARE PROVIDER_API CALL
Hellen Sánchez  Obstetrics and Gynecology  [address]  [place] [address]  Phone: [phone]  Fax: [phone]  Follow Up Time:

## 2023-09-12 ENCOUNTER — APPOINTMENT (OUTPATIENT)
Dept: ANTEPARTUM | Facility: CLINIC | Age: 28
End: 2023-09-12

## 2025-02-03 ENCOUNTER — APPOINTMENT (OUTPATIENT)
Dept: INTERNAL MEDICINE | Facility: CLINIC | Age: 30
End: 2025-02-03

## 2025-04-02 ENCOUNTER — APPOINTMENT (OUTPATIENT)
Dept: CARDIOLOGY | Facility: CLINIC | Age: 30
End: 2025-04-02
Payer: COMMERCIAL

## 2025-04-02 VITALS
TEMPERATURE: 98.5 F | HEART RATE: 72 BPM | WEIGHT: 172 LBS | HEIGHT: 62 IN | BODY MASS INDEX: 31.65 KG/M2 | SYSTOLIC BLOOD PRESSURE: 110 MMHG | OXYGEN SATURATION: 98 % | DIASTOLIC BLOOD PRESSURE: 70 MMHG

## 2025-04-02 DIAGNOSIS — Z82.49 FAMILY HISTORY OF ISCHEMIC HEART DISEASE AND OTHER DISEASES OF THE CIRCULATORY SYSTEM: ICD-10-CM

## 2025-04-02 DIAGNOSIS — Z13.228 ENCOUNTER FOR SCREENING FOR OTHER METABOLIC DISORDERS: ICD-10-CM

## 2025-04-02 DIAGNOSIS — L81.2 FRECKLES: ICD-10-CM

## 2025-04-02 DIAGNOSIS — Z00.00 ENCOUNTER FOR GENERAL ADULT MEDICAL EXAMINATION W/OUT ABNORMAL FINDINGS: ICD-10-CM

## 2025-04-02 DIAGNOSIS — Z78.9 OTHER SPECIFIED HEALTH STATUS: ICD-10-CM

## 2025-04-02 DIAGNOSIS — Z12.83 ENCOUNTER FOR SCREENING FOR MALIGNANT NEOPLASM OF SKIN: ICD-10-CM

## 2025-04-02 DIAGNOSIS — Z12.4 ENCOUNTER FOR SCREENING FOR MALIGNANT NEOPLASM OF CERVIX: ICD-10-CM

## 2025-04-02 DIAGNOSIS — R00.2 PALPITATIONS: ICD-10-CM

## 2025-04-02 DIAGNOSIS — Z12.39 ENCOUNTER FOR OTHER SCREENING FOR MALIGNANT NEOPLASM OF BREAST: ICD-10-CM

## 2025-04-02 PROCEDURE — 93000 ELECTROCARDIOGRAM COMPLETE: CPT

## 2025-04-02 PROCEDURE — 99385 PREV VISIT NEW AGE 18-39: CPT

## 2025-04-03 LAB
25(OH)D3 SERPL-MCNC: 13.6 NG/ML
ALBUMIN SERPL ELPH-MCNC: 4.4 G/DL
ALP BLD-CCNC: 96 U/L
ALT SERPL-CCNC: 16 U/L
ANION GAP SERPL CALC-SCNC: 11 MMOL/L
APPEARANCE: CLEAR
AST SERPL-CCNC: 14 U/L
BACTERIA: NEGATIVE /HPF
BASOPHILS # BLD AUTO: 0.03 K/UL
BASOPHILS NFR BLD AUTO: 0.4 %
BILIRUB SERPL-MCNC: 0.5 MG/DL
BILIRUBIN URINE: NEGATIVE
BLOOD URINE: NEGATIVE
BUN SERPL-MCNC: 9 MG/DL
CALCIUM SERPL-MCNC: 10.3 MG/DL
CAST: 0 /LPF
CHLORIDE SERPL-SCNC: 102 MMOL/L
CHOLEST SERPL-MCNC: 179 MG/DL
CO2 SERPL-SCNC: 25 MMOL/L
COLOR: NORMAL
CREAT SERPL-MCNC: 0.64 MG/DL
CRP SERPL-MCNC: 9 MG/L
EGFRCR SERPLBLD CKD-EPI 2021: 123 ML/MIN/1.73M2
EOSINOPHIL # BLD AUTO: 0.11 K/UL
EOSINOPHIL NFR BLD AUTO: 1.3 %
EPITHELIAL CELLS: 6 /HPF
ERYTHROCYTE [SEDIMENTATION RATE] IN BLOOD BY WESTERGREN METHOD: 6 MM/HR
ESTIMATED AVERAGE GLUCOSE: 100 MG/DL
FERRITIN SERPL-MCNC: 48 NG/ML
FOLATE SERPL-MCNC: 8.9 NG/ML
GLUCOSE QUALITATIVE U: NEGATIVE MG/DL
GLUCOSE SERPL-MCNC: 81 MG/DL
HAPTOGLOB SERPL-MCNC: 143 MG/DL
HBA1C MFR BLD HPLC: 5.1 %
HCG SERPL-MCNC: <1 MIU/ML
HCT VFR BLD CALC: 41.5 %
HDLC SERPL-MCNC: 64 MG/DL
HGB BLD-MCNC: 13.5 G/DL
IMM GRANULOCYTES NFR BLD AUTO: 0.2 %
IRON SERPL-MCNC: 165 UG/DL
KETONES URINE: ABNORMAL MG/DL
LDH SERPL-CCNC: 178 U/L
LDLC SERPL-MCNC: 102 MG/DL
LEUKOCYTE ESTERASE URINE: NEGATIVE
LYMPHOCYTES # BLD AUTO: 2.71 K/UL
LYMPHOCYTES NFR BLD AUTO: 32.9 %
MAGNESIUM SERPL-MCNC: 1.8 MG/DL
MAN DIFF?: NORMAL
MCHC RBC-ENTMCNC: 28.2 PG
MCHC RBC-ENTMCNC: 32.5 G/DL
MCV RBC AUTO: 86.6 FL
MICROSCOPIC-UA: NORMAL
MONOCYTES # BLD AUTO: 0.44 K/UL
MONOCYTES NFR BLD AUTO: 5.3 %
NEUTROPHILS # BLD AUTO: 4.92 K/UL
NEUTROPHILS NFR BLD AUTO: 59.9 %
NITRITE URINE: NEGATIVE
NONHDLC SERPL-MCNC: 115 MG/DL
PH URINE: 7.5
PHOSPHATE SERPL-MCNC: 3.2 MG/DL
PLATELET # BLD AUTO: 305 K/UL
POTASSIUM SERPL-SCNC: 4.4 MMOL/L
PROT SERPL-MCNC: 7.6 G/DL
PROTEIN URINE: NORMAL MG/DL
RBC # BLD: 4.79 M/UL
RBC # FLD: 13.2 %
RED BLOOD CELLS URINE: 4 /HPF
SODIUM SERPL-SCNC: 138 MMOL/L
SPECIFIC GRAVITY URINE: 1.03
T4 FREE SERPL-MCNC: 1.1 NG/DL
TRANSFERRIN SERPL-MCNC: 293 MG/DL
TRIGL SERPL-MCNC: 66 MG/DL
TSH SERPL-ACNC: 0.95 UIU/ML
UROBILINOGEN URINE: 0.2 MG/DL
VIT B12 SERPL-MCNC: 451 PG/ML
WBC # FLD AUTO: 8.23 K/UL
WHITE BLOOD CELLS URINE: 0 /HPF

## 2025-04-22 ENCOUNTER — NON-APPOINTMENT (OUTPATIENT)
Age: 30
End: 2025-04-22

## 2025-04-25 ENCOUNTER — APPOINTMENT (OUTPATIENT)
Dept: CARDIOLOGY | Facility: CLINIC | Age: 30
End: 2025-04-25
Payer: COMMERCIAL

## 2025-04-25 PROCEDURE — 93306 TTE W/DOPPLER COMPLETE: CPT

## 2025-06-16 ENCOUNTER — APPOINTMENT (OUTPATIENT)
Dept: INTERNAL MEDICINE | Facility: CLINIC | Age: 30
End: 2025-06-16
Payer: COMMERCIAL

## 2025-06-16 VITALS
HEART RATE: 79 BPM | WEIGHT: 183 LBS | OXYGEN SATURATION: 99 % | BODY MASS INDEX: 33.68 KG/M2 | TEMPERATURE: 98.4 F | SYSTOLIC BLOOD PRESSURE: 110 MMHG | HEIGHT: 62 IN | DIASTOLIC BLOOD PRESSURE: 76 MMHG

## 2025-06-16 PROCEDURE — 99203 OFFICE O/P NEW LOW 30 MIN: CPT

## 2025-06-16 RX ORDER — DOXYCYCLINE 100 MG/1
100 TABLET, FILM COATED ORAL
Qty: 14 | Refills: 0 | Status: ACTIVE | COMMUNITY
Start: 2025-06-16 | End: 1900-01-01